# Patient Record
Sex: FEMALE | Race: WHITE | Employment: UNEMPLOYED | URBAN - METROPOLITAN AREA
[De-identification: names, ages, dates, MRNs, and addresses within clinical notes are randomized per-mention and may not be internally consistent; named-entity substitution may affect disease eponyms.]

---

## 2024-02-22 LAB
ANTIBODY SCREEN OB: NEGATIVE
HEPATITIS B SURFACE ANTIGEN OB: NEGATIVE
HIV RESULT OB: NEGATIVE
RAPID PLASMA REAGIN OB: NONREACTIVE
RH FACTOR OB: POSITIVE
STREP GP B CULT OB: NEGATIVE

## 2024-02-28 ENCOUNTER — OFFICE VISIT (OUTPATIENT)
Dept: PERINATAL CARE | Facility: HOSPITAL | Age: 34
End: 2024-02-28
Attending: OBSTETRICS & GYNECOLOGY
Payer: MEDICAID

## 2024-02-28 VITALS
HEART RATE: 90 BPM | HEIGHT: 63 IN | BODY MASS INDEX: 32.78 KG/M2 | DIASTOLIC BLOOD PRESSURE: 61 MMHG | WEIGHT: 185 LBS | SYSTOLIC BLOOD PRESSURE: 98 MMHG

## 2024-02-28 DIAGNOSIS — O09.523 MULTIGRAVIDA OF ADVANCED MATERNAL AGE IN THIRD TRIMESTER (HCC): ICD-10-CM

## 2024-02-28 DIAGNOSIS — O34.32 CERVICAL CERCLAGE SUTURE PRESENT IN SECOND TRIMESTER (HCC): ICD-10-CM

## 2024-02-28 DIAGNOSIS — O34.33 CERVICAL CERCLAGE SUTURE PRESENT IN THIRD TRIMESTER (HCC): ICD-10-CM

## 2024-02-28 DIAGNOSIS — O09.293: ICD-10-CM

## 2024-02-28 DIAGNOSIS — O34.33: ICD-10-CM

## 2024-02-28 DIAGNOSIS — O09.523 MULTIGRAVIDA OF ADVANCED MATERNAL AGE IN THIRD TRIMESTER (HCC): Primary | ICD-10-CM

## 2024-02-28 PROCEDURE — 76811 OB US DETAILED SNGL FETUS: CPT | Performed by: OBSTETRICS & GYNECOLOGY

## 2024-02-28 NOTE — PROGRESS NOTES
Outpatient Maternal-Fetal Medicine Consultation    Dear Dr. Jean-Baptiste    Thank you for requesting ultrasound evaluation and maternal fetal medicine consultation on your patient Bia May.  As you are aware she is a 33 year old female  with a pacheco pregnancy and an Estimated Date of Delivery: 24.  A maternal-fetal medicine consultation was requested secondary to Advanced Maternal Age and Prior  Birth.  Her prenatal records and labs were reviewed.      She had a 23-week loss due to cervical insufficiency and  Alden.  She then had a daughter at term with a cerclage.  She had a  in that pregnancy.  She does not know the reason.  She said that they took out the cerclage and did her  but she does not know the presentation of the baby at that time.  It was in the 37th week.    Cerclage was placed in [facility] this pregnancy at 15 weeks.    Her op report was reviewed.  She has a Riley cerclage placed with Mersilene suture with the knot tied at 12:00.  ROS    HISTORY  OB History    Para Term  AB Living   4 3 2 1 0 2   SAB IAB Ectopic Multiple Live Births   0 0 0 0 2      # Outcome Date GA Lbr Rigo/2nd Weight Sex Delivery Anes PTL Lv   4 Current            3   23w0d    NORMAL SPONT   FD   2 Term  37w0d   F Caesarean   LORRIE   1 Term      NORMAL SPONT   LORRIE       Allergies:  Not on File   Current Meds:  Current Outpatient Medications   Medication Sig Dispense Refill    Prenatal Vit w/Qr-Sarpstilt-UZ (PNV OR) Take by mouth daily.      NADOLOL OR Take by mouth daily.          HISTORY:  No past medical history on file.  Cervical incompetence   No past surgical history on file.  Prior  x 1   No family history on file.   Social History     Socioeconomic History    Marital status: Single          PHYSICAL EXAMINATION:  BP 98/61 (BP Location: Right arm, Patient Position: Sitting, Cuff Size: adult)   Pulse 90   Ht 5' 3\" (1.6 m)    Wt 185 lb (83.9 kg)   BMI 32.77 kg/m²   Physical Exam  Constitutional:       Appearance: Normal appearance.   Abdominal:      Palpations: Abdomen is soft.      Tenderness: There is no abdominal tenderness.   Neurological:      Mental Status: She is alert.   Psychiatric:         Mood and Affect: Mood normal.         Behavior: Behavior normal.           OBSTETRIC ULTRASOUND  The patient had a level II ultrasound today which revealed size consistent with dates with some limited abdominal cord insertion view due to advanced gestational age but an otherwise normal detailed anatomic survey.  Ultrasound Findings:  Single IUP in cephalic presentation.    Placenta is anterior.   A 3 vessel cord is noted.  Cardiac activity is present at 146 bpm  EFW 1289 g ( 2 lb 13 oz);   MVP is 5.6 cm .     The fetal measurements are consistent with the established EDC. No ultrasound evidence of structural abnormalities are seen today. The nasal bone is present. No ultrasound evidence of markers for aneuploidy are seen. She understands that ultrasound exam cannot exclude genetic abnormalities and that genetic testing is recommended. The limitations of ultrasound were discussed.     Uterus and adnexa appeared normal  today on US  See PACS/Imaging Tab For Complete Ultrasound Report  I interpreted the results and reviewed them with the patient.    DISCUSSION  During her visit we discussed and reviewed the following issues:  ADVANCED MATERNAL AGE    Background  I reviewed with the patient that pregnancies in women of advanced maternal age (35 or older at delivery) are associated with elevated risks. Specifically, there is a higher rate of:  Fetal malformations  Preeclampsia  Gestational diabetes  Intrauterine fetal death    As a result, enhanced pregnancy surveillance is advised for these patients including a comprehensive ultrasound to assess for fetal malformations (at 20 weeks) and a third trimester ultrasound assessment for fetal growth  (at 32 weeks). In addition, weekly NST's (initiating at 36 weeks gestation for women 35-39 years and at 32 weeks gestation for women 40 years and older) are also advised. Routine obstetric care is more than adequate to assess for gestational diabetes and preeclampsia; hence, no further significant alterations in obstetric care are advised.    Medical Complications    Women 35 years of age or older can expect to experience two to three fold higher rates of hospitalization,  delivery, and pregnancy-related complications when compared to their younger counterparts.  The two most common medical problems complicating these  pregnanccies are hypertension and diabetes.   The incidence of preeclampsia in the general obstetric population is 3 to 4 percent; this increases to 5 to 10 percent in women over age 40 and is as high as 35 percent in women over age 50.   The incidence of gestational diabetes in the general obstetric population is 3 percent, rising to 7 to 12 percent in women over age 40 and 20 percent in women over age 50.  Women 35 years of age or older are more likely to be delivered by . The  delivery rate in the general obstetric population of the [place] is almost 30 percent, compared to almost 50 percent in women over age 40 to 45 and almost 80 percent in women age 50 to 63.          Fetal Death        A decision analysis tool using data from the [place] [employer]  Database predicted a strategy of weekly antepartum testing and labor induction would lower the risk of unexplained fetal death in women 35 years of age or older. In this model, weekly testing starting at 36 weeks of gestation would drop the risk of fetal death from 5.2 to 1.3 per 1000 pregnancies. While a policy of antepartum testing in older women does increase the chance that a women will be induced (71 inductions per fetal death averted) and thereby increases her risk of having a  delivery, only 14  additional cesareans would need to be performed to avert one unexplained fetal death.  Hence, weekly NST's are advised for women of advanced maternal age; testing should be initiated at 36 weeks for women 35-39 years and at 32 weeks for women 40 years and older.    Fetal Malformations    Cardiac malformations, clubfoot, and diaphragmatic hernia appear to occur with increased frequency in offspring of older women. These abnormalities are structural and unrelated to aneuploidy, thus they would not be detected by karyotype analysis.  For these reasons a complete, detailed ultrasound (level II) is advised even if the fetus has a normal karyotype.      Fetal Aneuploidy            We discussed  the increased risk of chromosomal abnormalities associated with advanced maternal age at age  34 year old. She understands that ultrasound exam cannot exclude potential genetic abnormalities.  Her estimated risk based on maternal age at term with any chromosome abnormality is about 1: 200 and with Down Syndrome is about 1: 350.   We also discussed the risks and benefits of having  genetic testing (CVS and amniocentesis) performed.      Non-invasive Pregnancy Testing (NIPT)  I reviewed current non-invasive screening options. Currently non-invasive pregnancy testing (NIPT) offers the highest detection rate (with the lowest false positive rate) for the detection of fetal aneuploidy amongst high-risk patients. The limitations of detailed mid-trimester sonography was reviewed with the patient. First trimester screening and second trimester multiple-marker serum serum screening as alternative aneuploidy screening options were also reviewed. However, both of these tests are associated with lower detection and higher false positive rates.              IMPRESSION:  IUP at 28w2d   AMA declines aneuploidy screening   History 23-week loss due to cervical incompetence  Prior  x 1  Mersilene Riley cerclage with knot at 12:00 placed at  15 weeks in Health system  ? HTN on coregard    RECOMMENDATIONS:  Continue care with Dr. Jean-Baptiste  Follow-up Growth ultrasound with BPP at 32 weeks.  Weekly NST's at 36 weeks. If hypertension, then weekly NSTs at 32 weeks.  Cerclage removal at 36 to 37 weeks          Thank you for allowing me to participate in the care of your patient.  Please do not hesitate to contact me if additional questions or concerns arise.      Hilda Penaloza M.D.    40 minutes spent in review of records, patient consultation, documentation and coordination of care.  The relevant clinical matter(s) are summarized above.     Note to patient and family  The 21st Century Cures Act makes medical notes available to patients in the interest of transparency.  However, please be advised that this is a medical document.  It is intended as gpqe-ft-ummo communication.  It is written and medical language may contain abbreviations or verbiage that are technical and unfamiliar.  It may appear blunt or direct.  Medical documents are intended to carry relevant information, facts as evident, and the clinical opinion of the practitioner.

## 2024-02-28 NOTE — PROGRESS NOTES
Pt here for Level II Ultrasound  +fm noted per patient  Pt denies complaints.   Pt's brother accompany pt, he speaks English fluently, pt authorizes pt's brother to assist with translation.

## 2024-04-04 ENCOUNTER — HOSPITAL ENCOUNTER (OUTPATIENT)
Dept: PERINATAL CARE | Facility: HOSPITAL | Age: 34
Discharge: HOME | End: 2024-04-04
Attending: OBSTETRICS & GYNECOLOGY
Payer: MEDICAID

## 2024-04-04 VITALS
DIASTOLIC BLOOD PRESSURE: 62 MMHG | WEIGHT: 185 LBS | HEART RATE: 88 BPM | BODY MASS INDEX: 33 KG/M2 | SYSTOLIC BLOOD PRESSURE: 93 MMHG

## 2024-04-04 DIAGNOSIS — O34.219 PREVIOUS CESAREAN DELIVERY AFFECTING PREGNANCY, ANTEPARTUM (HCC): ICD-10-CM

## 2024-04-04 DIAGNOSIS — O34.33 CERVICAL CERCLAGE SUTURE PRESENT IN THIRD TRIMESTER (HCC): ICD-10-CM

## 2024-04-04 DIAGNOSIS — O09.523 AMA (ADVANCED MATERNAL AGE) MULTIGRAVIDA 35+, THIRD TRIMESTER (HCC): Primary | ICD-10-CM

## 2024-04-04 DIAGNOSIS — O09.293: ICD-10-CM

## 2024-04-04 DIAGNOSIS — O09.523 AMA (ADVANCED MATERNAL AGE) MULTIGRAVIDA 35+, THIRD TRIMESTER (HCC): ICD-10-CM

## 2024-04-04 PROCEDURE — 76816 OB US FOLLOW-UP PER FETUS: CPT | Performed by: OBSTETRICS & GYNECOLOGY

## 2024-04-04 PROCEDURE — 76819 FETAL BIOPHYS PROFIL W/O NST: CPT

## 2024-04-04 NOTE — PROGRESS NOTES
Outpatient Maternal-Fetal Medicine Follow-Up     Dear Dr. Jean-Baptiste,     Thank you for requesting ultrasound evaluation and maternal fetal medicine consultation on your patient Bia May.  As you are aware she is a 34 year old female  with a Magdaleno pregnancy and an Estimated Date of Delivery: 24.  She returned to maternal-fetal medicine today for a follow-up visit.  Her history was reviewed from her prior visit and there were no interval changes.    Pertinent obstetrical history:  She had a 23-week loss due to cervical insufficiency and  Alden.  She then had a daughter at term with a cerclage.  She had a  in that pregnancy.  She does not know the reason.  She said that they took out the cerclage and did her  but she does not know the presentation of the baby at that time.  It was in the 37th week.     Cerclage was placed in [facility] this pregnancy at 15 weeks.     Her op report was reviewed.  She has a Riley cerclage placed with Mersilene suture with the knot tied at 12:00.     Antepartum Risk Factors  History of incompetent cervix  Advanced maternal age  Cervical cerclage in place  History of a     S: She reports good fetal movement.  She has no contractions or complications.  We reviewed that she passed her gestational diabetes screen.    At the patient's request, she had her brother serve as her     PHYSICAL EXAMINATION:  BP 93/62   Pulse 88   Wt 185 lb (83.9 kg)   BMI 32.77 kg/m²   General: alert and oriented, no acute distress  Abdomen: gravid, soft, non-tender  Extremities: non-tender, no edema     OBSTETRIC ULTRASOUND  The patient had a fetal growth & BPP ultrasound today which I interpreted the results and reviewed them with the patient.    Ultrasound Findings:  Single IUP in cephalic presentation.    Placenta is anterior.   A 3 vessel cord is noted.  Cardiac activity is present at 150 bpm  EFW 2170 g ( 4 lb 13 oz); 43%.     BILL is  12.1 cm.  MVP is 3.3 cm  BPP is 8/8.     The fetal measurements are consistent with established EDC. No gross ultrasound evidence of structural abnormalities are seen today. The patient understands that ultrasound cannot rule out all structural and chromosomal abnormalities.     See imaging tab for the complete US report.     DISCUSSION  During her visit we discussed and reviewed the following issues:  Advanced maternal age -   We reviewed the risks, both maternal and fetal, of advancing maternal age and pregnancy.  We discussed risks for fetal growth abnormalities, hypertensive complications, maternal hospitalizations,  mortality and  delivery.  We discussed the plan of care and the rationale for the increased surveillance.  See prior [facility] note from 2024 for detailed review.    We discussed her cervical cerclage and history of a  for unknown reason.  She is also discussed with Dr. Marguerite Singh.  Since there is not good information about the type of  that was performed, the patient is going to have a repeat .  Unlikely that the prior  was a classical  therefore I would advise waiting to 39 weeks before having a repeat .      We reviewed the signs and symptoms of preeclampsia,  labor and monitoring fetal movement.  We discussed reasons for her to call her physician.    We discussed the recommended plan of care based on her  risk factors.  Bia and her brother had their questions answered to their satisfaction.         IMPRESSION:  IUP at 33w3d  Normal fetal growth and  testing  Advanced maternal age  History of pregnancy loss at 23 weeks  Cervical cerclage in place  History of      RECOMMENDATIONS:  Continue care with Dr. Jean-Baptiste  Weekly NST at 36 weeks       Total time spent 30 minutes this calendar day which includes preparing to see the patient including chart review, obtaining and/or reviewing  additional medical history, performing a physical exam and evaluation, documenting clinical information in the electronic medical record, independently interpreting results, counseling the patient, communicating results to the patient/family/caregiver and coordinating care.     Case discussed with patient who demonstrated understanding and agreement with plan.     Thank you for allowing me to participate in the care of this patient.  Please feel free to contact me with any questions.    Hilda Rondon MD  Maternal-Fetal Medicine       Note to patient and family:  The 21st Century Cures Act makes medical notes available to patients in the interest of transparency.  However, please be advised that this is a medical document.  It is intended as a peer to peer communication.  It is written in medical language and may contain abbreviations or verbiage that are technical and unfamiliar.  It may appear blunt or direct.  Medical documents are intended to carry relevant information, facts as evident, and the clinical opinion of the practitioner.

## 2024-04-04 NOTE — ADDENDUM NOTE
Encounter addended by: Shellie Jones on: 4/4/2024 12:15 PM   Actions taken: Charge Capture section accepted

## 2024-04-23 ENCOUNTER — HOSPITAL ENCOUNTER (OUTPATIENT)
Facility: HOSPITAL | Age: 34
Discharge: HOME | End: 2024-04-23
Attending: OBSTETRICS & GYNECOLOGY | Admitting: OBSTETRICS & GYNECOLOGY
Payer: MEDICAID

## 2024-04-23 ENCOUNTER — APPOINTMENT (OUTPATIENT)
Dept: OBGYN CLINIC | Facility: HOSPITAL | Age: 34
End: 2024-04-23
Payer: MEDICAID

## 2024-04-23 VITALS
SYSTOLIC BLOOD PRESSURE: 102 MMHG | BODY MASS INDEX: 33 KG/M2 | DIASTOLIC BLOOD PRESSURE: 55 MMHG | WEIGHT: 189 LBS | HEART RATE: 90 BPM

## 2024-04-23 PROCEDURE — 59025 FETAL NON-STRESS TEST: CPT

## 2024-04-24 ENCOUNTER — NST DOCUMENTATION (OUTPATIENT)
Dept: OBGYN UNIT | Facility: HOSPITAL | Age: 34
End: 2024-04-24

## 2024-04-24 NOTE — NST
Nonstress Test   Patient: Bia May    Gestation: 36w2d    Diagnosis from order:      Problem List:   Patient Active Problem List   Diagnosis    History of pregnancy loss in prior pregnancy, currently pregnant in third trimester ([facility])    Multigravida of advanced maternal age in third trimester ([facility])    Cervical cerclage suture present in third trimester ([facility])       NST:        2024   NST DOCUMENTATION   Variability 6-25 BPM   Accelerations Yes   Decelerations None   Baseline 130 BPM   Uterine Irritability No   Contraction Frequency x2   Acoustic Stimulator No   Nonstress Test Interpretation Reactive   Nonstress Test Second Interpretation Reactive   NST Completed by MANSOOR Rocha RN   [facility]    Testing Plan Weekly NST   Provider Notified Dr Marguerite Singh         I agree with the above evaluation. NST completed.  CHANELL CARRILLO MD  2024  10:12 PM

## 2024-04-29 ENCOUNTER — APPOINTMENT (OUTPATIENT)
Dept: OBGYN CLINIC | Facility: HOSPITAL | Age: 34
End: 2024-04-29
Payer: MEDICAID

## 2024-04-29 ENCOUNTER — HOSPITAL ENCOUNTER (OUTPATIENT)
Facility: HOSPITAL | Age: 34
Discharge: HOME | End: 2024-04-29
Attending: OBSTETRICS & GYNECOLOGY | Admitting: OBSTETRICS & GYNECOLOGY
Payer: MEDICAID

## 2024-04-29 VITALS
DIASTOLIC BLOOD PRESSURE: 54 MMHG | RESPIRATION RATE: 20 BRPM | HEART RATE: 92 BPM | SYSTOLIC BLOOD PRESSURE: 96 MMHG | TEMPERATURE: 98 F

## 2024-04-29 DIAGNOSIS — Z34.90 PREGNANCY (HCC): ICD-10-CM

## 2024-04-29 PROCEDURE — 59025 FETAL NON-STRESS TEST: CPT

## 2024-05-01 ENCOUNTER — NST DOCUMENTATION (OUTPATIENT)
Dept: OBGYN UNIT | Facility: HOSPITAL | Age: 34
End: 2024-05-01

## 2024-05-03 ENCOUNTER — NST DOCUMENTATION (OUTPATIENT)
Dept: OBGYN UNIT | Facility: HOSPITAL | Age: 34
End: 2024-05-03

## 2024-05-06 ENCOUNTER — HOSPITAL ENCOUNTER (OUTPATIENT)
Facility: HOSPITAL | Age: 34
Discharge: HOME | End: 2024-05-06
Attending: OBSTETRICS & GYNECOLOGY | Admitting: OBSTETRICS & GYNECOLOGY
Payer: MEDICAID

## 2024-05-06 ENCOUNTER — APPOINTMENT (OUTPATIENT)
Dept: OBGYN CLINIC | Facility: HOSPITAL | Age: 34
End: 2024-05-06
Payer: MEDICAID

## 2024-05-06 VITALS — HEART RATE: 81 BPM | DIASTOLIC BLOOD PRESSURE: 61 MMHG | SYSTOLIC BLOOD PRESSURE: 103 MMHG

## 2024-05-06 DIAGNOSIS — O09.529 AMA (ADVANCED MATERNAL AGE) MULTIGRAVIDA 35+ (HCC): ICD-10-CM

## 2024-05-06 PROCEDURE — 59025 FETAL NON-STRESS TEST: CPT

## 2024-05-11 ENCOUNTER — NST DOCUMENTATION (OUTPATIENT)
Dept: OBGYN UNIT | Facility: HOSPITAL | Age: 34
End: 2024-05-11

## 2024-05-11 NOTE — NST
Nonstress Test   Patient: Bia May    Gestation: 38w5d    Diagnosis from order:      Problem List:   Patient Active Problem List   Diagnosis    History of pregnancy loss in prior pregnancy, currently pregnant in third trimester ([facility])    Multigravida of advanced maternal age in third trimester ([facility])    Cervical cerclage suture present in third trimester ([facility])       NST:        2024   NST DOCUMENTATION   Variability 6-25 BPM   Accelerations Yes   Decelerations None   Baseline 130 BPM   Uterine Irritability No   Contractions Not present   Acoustic Stimulator No   Nonstress Test Interpretation Reactive   Nonstress Test Second Interpretation Reactive   Comments Scheduled nst forAMA and cerclage in place   NST Completed by Jamie RN   Disposition Home    Testing Plan Weekly NST   Provider Notified DR Carrillo            I agree with the above evaluation. NST completed.  CHANELL CARRILLO MD  2024  9:50 PM

## 2024-05-12 ENCOUNTER — NST DOCUMENTATION (OUTPATIENT)
Dept: OBGYN UNIT | Facility: HOSPITAL | Age: 34
End: 2024-05-12

## 2024-05-12 NOTE — NST
Nonstress Test   Patient: Bia May    Gestation: 38w6d    Diagnosis from order:      Problem List:   Patient Active Problem List   Diagnosis    History of pregnancy loss in prior pregnancy, currently pregnant in third trimester ([facility])    Multigravida of advanced maternal age in third trimester ([facility])    Cervical cerclage suture present in third trimester ([facility])       NST:        2024   NST DOCUMENTATION   Variability 6-25 BPM   Accelerations Yes   Decelerations None   Baseline 130 BPM   Uterine Irritability No   Contractions Not present   Acoustic Stimulator No   Nonstress Test Interpretation Reactive   Nonstress Test Second Interpretation Reactive   Comments Scheduled nst forAMA and cerclage in place   NST Completed by Jamie RN   Disposition Home    Testing Plan Weekly NST   Provider Notified DR Carrillo            I agree with the above evaluation. NST completed.  CHANELL CARRILLO MD  2024  9:51 PM

## 2024-05-13 ENCOUNTER — ANESTHESIA EVENT (OUTPATIENT)
Dept: OBGYN UNIT | Facility: HOSPITAL | Age: 34
End: 2024-05-13
Payer: MEDICAID

## 2024-05-13 ENCOUNTER — ANESTHESIA (OUTPATIENT)
Dept: OBGYN UNIT | Facility: HOSPITAL | Age: 34
End: 2024-05-13
Payer: MEDICAID

## 2024-05-13 ENCOUNTER — HOSPITAL ENCOUNTER (INPATIENT)
Facility: HOSPITAL | Age: 34
LOS: 2 days | Discharge: HOME | End: 2024-05-15
Attending: OBSTETRICS & GYNECOLOGY | Admitting: OBSTETRICS & GYNECOLOGY

## 2024-05-13 PROBLEM — Z34.90 PREGNANT (HCC): Status: ACTIVE | Noted: 2024-05-13

## 2024-05-13 LAB
ANTIBODY SCREEN: NEGATIVE
BASOPHILS # BLD AUTO: 0.02 X10(3) UL (ref 0–0.2)
BASOPHILS NFR BLD AUTO: 0.2 %
DEPRECATED RDW RBC AUTO: 45.6 FL (ref 35.1–46.3)
EOSINOPHIL # BLD AUTO: 0.09 X10(3) UL (ref 0–0.7)
EOSINOPHIL NFR BLD AUTO: 1 %
ERYTHROCYTE [DISTWIDTH] IN BLOOD BY AUTOMATED COUNT: 13.7 % (ref 11–15)
HCT VFR BLD AUTO: 32.1 %
HGB BLD-MCNC: 11.4 G/DL
IMM GRANULOCYTES # BLD AUTO: 0.07 X10(3) UL (ref 0–1)
IMM GRANULOCYTES NFR BLD: 0.8 %
LYMPHOCYTES # BLD AUTO: 1.83 X10(3) UL (ref 1–4)
LYMPHOCYTES NFR BLD AUTO: 21 %
MCH RBC QN AUTO: 32.3 PG (ref 26–34)
MCHC RBC AUTO-ENTMCNC: 35.5 G/DL (ref 31–37)
MCV RBC AUTO: 90.9 FL
MONOCYTES # BLD AUTO: 0.56 X10(3) UL (ref 0.1–1)
MONOCYTES NFR BLD AUTO: 6.4 %
NEUTROPHILS # BLD AUTO: 6.14 X10 (3) UL (ref 1.5–7.7)
NEUTROPHILS # BLD AUTO: 6.14 X10(3) UL (ref 1.5–7.7)
NEUTROPHILS NFR BLD AUTO: 70.6 %
PLATELET # BLD AUTO: 205 10(3)UL (ref 150–450)
RBC # BLD AUTO: 3.53 X10(6)UL
RH BLOOD TYPE: POSITIVE
RH BLOOD TYPE: POSITIVE
T PALLIDUM AB SER QL IA: NONREACTIVE
WBC # BLD AUTO: 8.7 X10(3) UL (ref 4–11)

## 2024-05-13 PROCEDURE — 86901 BLOOD TYPING SEROLOGIC RH(D): CPT | Performed by: OBSTETRICS & GYNECOLOGY

## 2024-05-13 PROCEDURE — S0028 INJECTION, FAMOTIDINE, 20 MG: HCPCS | Performed by: OBSTETRICS & GYNECOLOGY

## 2024-05-13 PROCEDURE — 85025 COMPLETE CBC W/AUTO DIFF WBC: CPT | Performed by: OBSTETRICS & GYNECOLOGY

## 2024-05-13 PROCEDURE — 86850 RBC ANTIBODY SCREEN: CPT | Performed by: OBSTETRICS & GYNECOLOGY

## 2024-05-13 PROCEDURE — 86900 BLOOD TYPING SEROLOGIC ABO: CPT | Performed by: OBSTETRICS & GYNECOLOGY

## 2024-05-13 PROCEDURE — 86780 TREPONEMA PALLIDUM: CPT | Performed by: OBSTETRICS & GYNECOLOGY

## 2024-05-13 RX ORDER — ONDANSETRON 2 MG/ML
4 INJECTION INTRAMUSCULAR; INTRAVENOUS EVERY 6 HOURS PRN
Status: DISCONTINUED | OUTPATIENT
Start: 2024-05-13 | End: 2024-05-13 | Stop reason: HOSPADM

## 2024-05-13 RX ORDER — ONDANSETRON 2 MG/ML
4 INJECTION INTRAMUSCULAR; INTRAVENOUS EVERY 6 HOURS PRN
Status: DISCONTINUED | OUTPATIENT
Start: 2024-05-13 | End: 2024-05-15

## 2024-05-13 RX ORDER — IBUPROFEN 600 MG/1
600 TABLET ORAL EVERY 6 HOURS
Status: DISCONTINUED | OUTPATIENT
Start: 2024-05-14 | End: 2024-05-15

## 2024-05-13 RX ORDER — CITRIC ACID/SODIUM CITRATE 334-500MG
30 SOLUTION, ORAL ORAL ONCE
Status: COMPLETED | OUTPATIENT
Start: 2024-05-13 | End: 2024-05-13

## 2024-05-13 RX ORDER — KETOROLAC TROMETHAMINE 30 MG/ML
30 INJECTION, SOLUTION INTRAMUSCULAR; INTRAVENOUS ONCE
Status: COMPLETED | OUTPATIENT
Start: 2024-05-13 | End: 2024-05-13

## 2024-05-13 RX ORDER — BUPIVACAINE HYDROCHLORIDE 7.5 MG/ML
INJECTION, SOLUTION INTRASPINAL
Status: COMPLETED | OUTPATIENT
Start: 2024-05-13 | End: 2024-05-13

## 2024-05-13 RX ORDER — LIDOCAINE HYDROCHLORIDE 10 MG/ML
INJECTION, SOLUTION INFILTRATION; PERINEURAL
Status: COMPLETED | OUTPATIENT
Start: 2024-05-13 | End: 2024-05-13

## 2024-05-13 RX ORDER — HYDROCODONE BITARTRATE AND ACETAMINOPHEN 7.5; 325 MG/1; MG/1
1 TABLET ORAL EVERY 6 HOURS PRN
Status: ACTIVE | OUTPATIENT
Start: 2024-05-13 | End: 2024-05-14

## 2024-05-13 RX ORDER — HYDROCODONE BITARTRATE AND ACETAMINOPHEN 7.5; 325 MG/1; MG/1
2 TABLET ORAL EVERY 6 HOURS PRN
Status: ACTIVE | OUTPATIENT
Start: 2024-05-13 | End: 2024-05-14

## 2024-05-13 RX ORDER — FAMOTIDINE 10 MG/ML
20 INJECTION, SOLUTION INTRAVENOUS ONCE
Status: COMPLETED | OUTPATIENT
Start: 2024-05-13 | End: 2024-05-13

## 2024-05-13 RX ORDER — SODIUM CHLORIDE, SODIUM LACTATE, POTASSIUM CHLORIDE, CALCIUM CHLORIDE 600; 310; 30; 20 MG/100ML; MG/100ML; MG/100ML; MG/100ML
125 INJECTION, SOLUTION INTRAVENOUS CONTINUOUS
Status: DISCONTINUED | OUTPATIENT
Start: 2024-05-13 | End: 2024-05-13 | Stop reason: HOSPADM

## 2024-05-13 RX ORDER — ONDANSETRON 2 MG/ML
4 INJECTION INTRAMUSCULAR; INTRAVENOUS ONCE AS NEEDED
Status: ACTIVE | OUTPATIENT
Start: 2024-05-13 | End: 2024-05-13

## 2024-05-13 RX ORDER — AMMONIA INHALANTS 0.04 G/.3ML
0.3 INHALANT RESPIRATORY (INHALATION) AS NEEDED
Status: DISCONTINUED | OUTPATIENT
Start: 2024-05-13 | End: 2024-05-15

## 2024-05-13 RX ORDER — HYDROMORPHONE HYDROCHLORIDE 1 MG/ML
0.4 INJECTION, SOLUTION INTRAMUSCULAR; INTRAVENOUS; SUBCUTANEOUS
Status: ACTIVE | OUTPATIENT
Start: 2024-05-13 | End: 2024-05-14

## 2024-05-13 RX ORDER — NALOXONE HYDROCHLORIDE 0.4 MG/ML
0.08 INJECTION, SOLUTION INTRAMUSCULAR; INTRAVENOUS; SUBCUTANEOUS
Status: ACTIVE | OUTPATIENT
Start: 2024-05-13 | End: 2024-05-14

## 2024-05-13 RX ORDER — HYDROMORPHONE HYDROCHLORIDE 1 MG/ML
0.6 INJECTION, SOLUTION INTRAMUSCULAR; INTRAVENOUS; SUBCUTANEOUS
Status: DISPENSED | OUTPATIENT
Start: 2024-05-13 | End: 2024-05-14

## 2024-05-13 RX ORDER — SODIUM CHLORIDE, SODIUM LACTATE, POTASSIUM CHLORIDE, CALCIUM CHLORIDE 600; 310; 30; 20 MG/100ML; MG/100ML; MG/100ML; MG/100ML
INJECTION, SOLUTION INTRAVENOUS CONTINUOUS
Status: DISCONTINUED | OUTPATIENT
Start: 2024-05-13 | End: 2024-05-15

## 2024-05-13 RX ORDER — DIPHENHYDRAMINE HYDROCHLORIDE 50 MG/ML
12.5 INJECTION INTRAMUSCULAR; INTRAVENOUS EVERY 4 HOURS PRN
Status: ACTIVE | OUTPATIENT
Start: 2024-05-13 | End: 2024-05-14

## 2024-05-13 RX ORDER — DOCUSATE SODIUM 100 MG/1
100 CAPSULE, LIQUID FILLED ORAL
Status: DISCONTINUED | OUTPATIENT
Start: 2024-05-13 | End: 2024-05-15

## 2024-05-13 RX ORDER — METOCLOPRAMIDE 10 MG/1
10 TABLET ORAL ONCE
Status: COMPLETED | OUTPATIENT
Start: 2024-05-13 | End: 2024-05-13

## 2024-05-13 RX ORDER — EPHEDRINE SULFATE 50 MG/ML
INJECTION INTRAVENOUS AS NEEDED
Status: DISCONTINUED | OUTPATIENT
Start: 2024-05-13 | End: 2024-05-13 | Stop reason: SURG

## 2024-05-13 RX ORDER — MORPHINE SULFATE 1 MG/ML
INJECTION, SOLUTION EPIDURAL; INTRATHECAL; INTRAVENOUS
Status: COMPLETED | OUTPATIENT
Start: 2024-05-13 | End: 2024-05-13

## 2024-05-13 RX ORDER — ACETAMINOPHEN 500 MG
1000 TABLET ORAL EVERY 6 HOURS
Status: DISCONTINUED | OUTPATIENT
Start: 2024-05-13 | End: 2024-05-15

## 2024-05-13 RX ORDER — ACETAMINOPHEN 500 MG
1000 TABLET ORAL ONCE
Status: COMPLETED | OUTPATIENT
Start: 2024-05-13 | End: 2024-05-13

## 2024-05-13 RX ORDER — KETOROLAC TROMETHAMINE 30 MG/ML
30 INJECTION, SOLUTION INTRAMUSCULAR; INTRAVENOUS EVERY 6 HOURS
Status: DISPENSED | OUTPATIENT
Start: 2024-05-13 | End: 2024-05-14

## 2024-05-13 RX ORDER — PROCHLORPERAZINE EDISYLATE 5 MG/ML
5 INJECTION INTRAMUSCULAR; INTRAVENOUS ONCE AS NEEDED
Status: ACTIVE | OUTPATIENT
Start: 2024-05-13 | End: 2024-05-13

## 2024-05-13 RX ORDER — HALOPERIDOL 5 MG/ML
0.5 INJECTION INTRAMUSCULAR ONCE AS NEEDED
Status: ACTIVE | OUTPATIENT
Start: 2024-05-13 | End: 2024-05-13

## 2024-05-13 RX ORDER — METOCLOPRAMIDE HYDROCHLORIDE 5 MG/ML
10 INJECTION INTRAMUSCULAR; INTRAVENOUS ONCE
Status: COMPLETED | OUTPATIENT
Start: 2024-05-13 | End: 2024-05-13

## 2024-05-13 RX ORDER — NALBUPHINE HYDROCHLORIDE 10 MG/ML
2.5 INJECTION, SOLUTION INTRAMUSCULAR; INTRAVENOUS; SUBCUTANEOUS
Status: DISCONTINUED | OUTPATIENT
Start: 2024-05-13 | End: 2024-05-15

## 2024-05-13 RX ORDER — DIPHENHYDRAMINE HCL 25 MG
25 CAPSULE ORAL EVERY 4 HOURS PRN
Status: ACTIVE | OUTPATIENT
Start: 2024-05-13 | End: 2024-05-14

## 2024-05-13 RX ORDER — GABAPENTIN 300 MG/1
300 CAPSULE ORAL EVERY 8 HOURS PRN
Status: DISCONTINUED | OUTPATIENT
Start: 2024-05-13 | End: 2024-05-15

## 2024-05-13 RX ORDER — CEFAZOLIN SODIUM/WATER 2 G/20 ML
2 SYRINGE (ML) INTRAVENOUS ONCE
Status: COMPLETED | OUTPATIENT
Start: 2024-05-13 | End: 2024-05-13

## 2024-05-13 RX ORDER — ACETAMINOPHEN 325 MG/1
650 TABLET ORAL EVERY 6 HOURS PRN
Status: ACTIVE | OUTPATIENT
Start: 2024-05-13 | End: 2024-05-14

## 2024-05-13 RX ORDER — BISACODYL 10 MG
10 SUPPOSITORY, RECTAL RECTAL ONCE AS NEEDED
Status: DISCONTINUED | OUTPATIENT
Start: 2024-05-13 | End: 2024-05-15

## 2024-05-13 RX ORDER — ENOXAPARIN SODIUM 100 MG/ML
40 INJECTION SUBCUTANEOUS EVERY 24 HOURS
Status: DISCONTINUED | OUTPATIENT
Start: 2024-05-13 | End: 2024-05-15

## 2024-05-13 RX ORDER — NALBUPHINE HYDROCHLORIDE 10 MG/ML
2.5 INJECTION, SOLUTION INTRAMUSCULAR; INTRAVENOUS; SUBCUTANEOUS EVERY 4 HOURS PRN
Status: ACTIVE | OUTPATIENT
Start: 2024-05-13 | End: 2024-05-14

## 2024-05-13 RX ORDER — FAMOTIDINE 20 MG/1
20 TABLET, FILM COATED ORAL ONCE
Status: COMPLETED | OUTPATIENT
Start: 2024-05-13 | End: 2024-05-13

## 2024-05-13 RX ORDER — SIMETHICONE 80 MG
80 TABLET,CHEWABLE ORAL 3 TIMES DAILY PRN
Status: DISCONTINUED | OUTPATIENT
Start: 2024-05-13 | End: 2024-05-15

## 2024-05-13 RX ADMIN — CEFAZOLIN SODIUM/WATER 2 G: 2 G/20 ML SYRINGE (ML) INTRAVENOUS at 07:55:00

## 2024-05-13 RX ADMIN — EPHEDRINE SULFATE 10 MG: 50 INJECTION INTRAVENOUS at 07:51:00

## 2024-05-13 RX ADMIN — BUPIVACAINE HYDROCHLORIDE 1.5 ML: 7.5 INJECTION, SOLUTION INTRASPINAL at 07:47:00

## 2024-05-13 RX ADMIN — LIDOCAINE HYDROCHLORIDE 5 ML: 10 INJECTION, SOLUTION INFILTRATION; PERINEURAL at 07:39:00

## 2024-05-13 RX ADMIN — SODIUM CHLORIDE, SODIUM LACTATE, POTASSIUM CHLORIDE, CALCIUM CHLORIDE: 600; 310; 30; 20 INJECTION, SOLUTION INTRAVENOUS at 08:33:00

## 2024-05-13 RX ADMIN — MORPHINE SULFATE 0.3 MG: 1 INJECTION, SOLUTION EPIDURAL; INTRATHECAL; INTRAVENOUS at 07:47:00

## 2024-05-13 RX ADMIN — EPHEDRINE SULFATE 10 MG: 50 INJECTION INTRAVENOUS at 08:00:00

## 2024-05-13 NOTE — ANESTHESIA POSTPROCEDURE EVALUATION
Patient: Bia May    Procedure Summary       Date: 24 Room / Location: [facility] L+D OR  / [facility] L+D OR    Anesthesia Start: 738 Anesthesia Stop: 854    Procedure:  SECTION (Abdomen) Diagnosis: (repeat c/s)    Surgeons: Hollie Jean-Baptiste MD Anesthesiologist: Claudio Ortiz MD    Anesthesia Type: spinal ASA Status: 2            Anesthesia Type: spinal    Vitals Value Taken Time   BP 94/70 24 0854   Temp 97.4 24 0854   Pulse 88 24 0854   Resp 16 24 0854   SpO2 100 % 24 0854   Vitals shown include unfiled device data.    [facility] AN Post Evaluation:   Patient Evaluated in PACU  Patient Participation: complete - patient participated  Level of Consciousness: awake  Pain Management: adequate  Airway Patency:patent  Dental exam unchanged from preop  Yes    Cardiovascular Status: acceptable  Respiratory Status: acceptable  Postoperative Hydration acceptable      CLAUDIO ORTIZ MD  2024 8:54 AM

## 2024-05-13 NOTE — ANESTHESIA PROCEDURE NOTES
Spinal Block    Date/Time: 5/13/2024 7:39 AM    Performed by: Kb Ortiz MD  Authorized by: Kb Ortiz MD      General Information and Staff    Start Time:  5/13/2024 7:39 AM  End Time:  5/13/2024 7:50 AM  Anesthesiologist:  Kb Ortiz MD  Performed by:  Anesthesiologist  Patient Location:  OB  Preanesthetic Checklist: patient identified, IV checked, risks and benefits discussed, monitors and equipment checked, pre-op evaluation, timeout performed, anesthesia consent and sterile technique used      Procedure Details    Patient Position:  Sitting  Prep: ChloraPrep    Monitoring:  Cardiac monitor  Approach:  Midline  Location:  L3-4  Injection Technique:  Single-shot    Needle    Needle Type:  Pencil-tip  Needle Gauge:  24 G  Needle Length:  3.5 in    Assessment    Sensory Level:   Events: clear CSF, CSF aspirated, well tolerated and blood negative      Additional Comments

## 2024-05-13 NOTE — PROGRESS NOTES
Transferred Mother to room    361  via cart, in stable condition. Report given to  Jace WEBB. Bed in locked and low position, side rails up x 2, ID's checked and verified, call light with in reach, reinforced pt to call for assistance when needs  to go to the bathroom.

## 2024-05-13 NOTE — H&P
[facility]  part of [facility]    OB Consult    Bia May Patient Status:  Surgery Admit - Inpt    3/15/1990 MRN [id number]   Location [facility] BIRTH CENTER Attending Hollie Jean-Baptiste MD   Hosp Day # 0 PCP No primary care provider on file.       Date of Admission:  2024    SUBJECTIVE:    Reason for Admission:  Repeat  section    History of Present Illness:  Patient is a 34 year old   IUP 39 weeks admitted for repeat  section. Presently without complaints. Good fetal movement        Medications:  Medications Prior to Admission   Medication Sig Dispense Refill Last Dose    Prenatal Vit w/Cb-Ewhcdevod-XG (PNV OR) Take by mouth daily.   2024    NADOLOL OR Take by mouth daily.          Allergies:  No Known Allergies     Past Medical History:  No past medical history on file.    Past Surgical History:  Past Surgical History:   Procedure Laterality Date          Cholecystectomy         Past OB History:  OB History    Para Term  AB Living   4 3 2 1 0 2   SAB IAB Ectopic Multiple Live Births   0 0 0 0 2      # Outcome Date GA Lbr Rigo/2nd Weight Sex Type Anes PTL Lv   4 Current            3 Term 2018 37w0d   F Caesarean   LORRIE   2  2017 23w0d    NORMAL SPONT   FD   1 Term 2008     NORMAL SPONT   LORRIE       Past GYN History:   Menses Q 30 days x 5-7 days    Social History:  Social History     Tobacco Use    Smoking status: Never     Passive exposure: Never    Smokeless tobacco: Never   Substance Use Topics    Alcohol use: Not Currently        Review of Systems:   As above    OBJECTIVE:  Temp:  [98.1 °F (36.7 °C)] 98.1 °F (36.7 °C)  Pulse:  [96] 96  Resp:  [16] 16  BP: (97)/(59) 97/59  No intake or output data in the 24 hours ending 24 0720    General:   alert, appears stated age, and cooperative   Skin:   normal   Lungs:   clear to auscultation bilaterally   Heart:   S1, S2 normal, regular rate  and rhythm,    Abdomen:  Gravid, NT   Extremities:  NT     FHTs: Reactive   Presentations: Cephalic       ASSESSMENT/PLAN:  34  IUP 39 weeks admission for repeat     All of the findings and plan were discussed with the patient.  She notes understanding and agrees with the plan of care.  All questions were answered to the best of my ability at this time.    CHANELL CARRILLO MD   2024  7:20 AM

## 2024-05-13 NOTE — CM/SW NOTE
[facility] self referral due to finances/WIC and SDOH resources  MDO to [facility] for EDPS    SW spoke to pt via  pt brother)  [facility] confirmed face sheet contact as correct.    Baby boy/girl name:Shannen Rodriguez  Date & time of delivery:24 @ 8:04am  Delivery method: section  Siblings age:16 and 6 yr old    Patient employed: Denied  Length of maternity leave:n/a    Father of baby employed[place] of country  Length of paternity leave:n/a    Breast or formula feed:Formula feed    Pediatrician:  [facility] encouraged pt to schedule infant first appointment (usually within 48 hours of discharge) prior to pt discharge. Pt expressed understanding.     Infant Insurance:Medicaid  [employer] [facility] contacted:Yes    Mental Health History: Denied    Medications:n/a    Therapist:n/a    Psychiatrist:n/a    [facility] discussed signs, symptoms and risks associated with post partum depression & anxiety.  [facility] provided pt with PMAD resources.  Other resources provided:[employer] transportation and mental health resources. [facility] specific resources. SDOH resources  Pt endorses she is current w/WIC services and was encouraged to contact them informing of infants birth.  Pt expressed understanding.     Patient support system:Extended family    Patient denied current questions/needs from [facility].    SW/CM to remain available for support and/or discharge planning.      Carmelina Kc, MSW, LSW  Social Work   Ext:#[phone]

## 2024-05-13 NOTE — ANESTHESIA PREPROCEDURE EVALUATION
Anesthesia PreOp Note    HPI:     Bia May is a 34 year old female who presents for preoperative consultation requested by: Hollie Jean-Baptiste MD    Date of Surgery: 2024    Procedure(s):   SECTION  Indication: repeat c/s    Relevant Problems   No relevant active problems       NPO:  Last Liquid Consumption Date: 24  Last Liquid Consumption Time:   Last Solid Consumption Date: 24  Last Solid Consumption Time:   Last Liquid Consumption Date: 24          History Review:  Patient Active Problem List    Diagnosis Date Noted    Pregnant (Former[facility]) 2024    History of pregnancy loss in prior pregnancy, currently pregnant in third trimester (Formerly [facility]) 2024    Multigravida of advanced maternal age in third trimester (Formerly [facility]) 2024    Cervical cerclage suture present in third trimester (Formerly [facility]) 2024       No past medical history on file.    Past Surgical History:   Procedure Laterality Date          Cholecystectomy         Medications Prior to Admission   Medication Sig Dispense Refill Last Dose    Prenatal Vit w/Lz-Dcxzoxizi-AG (PNV OR) Take by mouth daily.   2024    NADOLOL OR Take by mouth daily.        Current Facility-Administered Medications Ordered in Epic   Medication Dose Route Frequency Provider Last Rate Last Admin    lactated ringers infusion  125 mL/hr Intravenous Continuous Hollie Jean-Baptiste MD        ondansetron (Zofran) 4 MG/2ML injection 4 mg  4 mg Intravenous Q6H PRN Hollie Jean-Baptiste MD        sodium citrate-citric acid (Bicitra) 500-334 MG/5ML oral solution 30 mL  30 mL Oral Once Hollie Jean-Baptiste MD        oxyTOCIN in sodium chloride 0.9% (Pitocin) 30 Units/500mL infusion premix  62.5-900 john-units/min Intravenous Continuous Hollie Jean-Baptiste MD        famotidine (Pepcid) tab 20 mg  20 mg Oral Once Hollie Jean-Baptiste MD        Or    famotidine (Pepcid) 20 mg/2mL injection 20 mg  20 mg Intravenous Once Estiven  MD Hollie        metoclopramide (Reglan) tab 10 mg  10 mg Oral Once Hollie Jean-Baptiste MD        Or    metoclopramide (Reglan) 5 mg/mL injection 10 mg  10 mg Intravenous Once Hollie Jean-Bpatiste MD        ceFAZolin (Ancef) 2 g in 20mL IV syringe premix  2 g Intravenous Once Hollie Jean-Baptiste MD         No current Epic-ordered outpatient medications on file.       No Known Allergies    No family history on file.  Social History     Socioeconomic History    Marital status: Single   Tobacco Use    Smoking status: Never     Passive exposure: Never    Smokeless tobacco: Never   Vaping Use    Vaping status: Never Used   Substance and Sexual Activity    Alcohol use: Not Currently    Drug use: Never       Available pre-op labs reviewed.  Lab Results   Component Value Date    WBC 8.7 05/13/2024    RBC 3.53 (L) 05/13/2024    HGB 11.4 (L) 05/13/2024    HCT 32.1 (L) 05/13/2024    MCV 90.9 05/13/2024    MCH 32.3 05/13/2024    MCHC 35.5 05/13/2024    RDW 13.7 05/13/2024    .0 05/13/2024             Vital Signs:  There is no height or weight on file to calculate BMI.   oral temperature is 98.1 °F (36.7 °C). Her blood pressure is 97/59 and her pulse is 96. Her respiration is 16.   Vitals:    05/13/24 0645   BP: 97/59   Pulse: 96   Resp: 16   Temp: 98.1 °F (36.7 °C)   TempSrc: Oral        Anesthesia Evaluation     Patient summary reviewed and Nursing notes reviewed    No history of anesthetic complications   Airway   Mallampati: II  Neck ROM: full  Dental      Pulmonary - negative ROS and normal exam   Cardiovascular - negative ROS and normal exam    Neuro/Psych - negative ROS     GI/Hepatic/Renal - negative ROS     Endo/Other - negative ROS   Abdominal  - normal exam  (+) obese                 Anesthesia Plan:   ASA:  2  Plan:   Spinal  Informed Consent Plan and Risks Discussed With:  Patient      I have informed Bia May and/or legal guardian or family member of the nature of the anesthetic plan,  benefits, risks including possible dental damage if relevant, major complications, and any alternative forms of anesthetic management.   All of the patient's questions were answered to the best of my ability. The patient desires the anesthetic management as planned.  CLAUDIO LOPEZ MD  5/13/2024 7:18 AM  Present on Admission:  **None**

## 2024-05-13 NOTE — PLAN OF CARE
Mom received into postpartum room in stable condition with all belongings and care partner. Assisted into bed in low locked position with side rails up and call light in reach. Admission fundal assessment completed, vitals stable, bleeding within normal limits. Report received from labor RN Caroline and Mehran. Patient and family oriented to room, plan of care discussed. Charge RN Justine assisted with orienting patient to room and plan of care in [place].

## 2024-05-13 NOTE — BRIEF OP NOTE
[facility]  part of [facility]     Operative Brief Note    Bia May Patient Status:  Inpatient    3/15/1990 MRN [id number]   Location [facility] 3SE Attending Chanell Jean-Baptiste MD   Hosp Day # 0 PCP No primary care provider on file.     Pre Op Diagnosis:  Full term IUP, previous  section  Post Op Diagnosis: same as pre-op  Procedure:   Surgical/Procedural Cases on this Admission       Case IDs Date Procedure Surgeon Location Status    [id number] 24  SECTION Chanell Jean-Baptiste MD [facility] L+D OR [facility]  Surgeon:  CHANELL JEAN-BAPTISTE MD  Assistant Surgeon:  Bj Motley   Anesthesia: spinal  Complications: none  Neonatologist Present: yes  Findings:  see operative report    Delivery     Infant  Date of Delivery: 2024    Time of Delivery: 8:04 AM   Delivery Type: Caesarean Section     Infant Sex  Information for the patient's :  Richy Pompa [[id number]]   child     Infant Birthweight  Information for the patient's :  Richy Pompa [[id number]]   7 lb 8.3 oz (3.41 kg)      Presentation Vertex [1]   Position   Occiput [1]  Anterior [1]     Apgars:  1 minute: 9                5 minutes: 9                         10 minutes:      Placenta  Date/Time of Delivery: 2024  8:06 AM    Delivery: manual extraction  Placenta to Pathology: no  Cord Gases Submitted: yes  Cord Blood/Tissue Collection: yes  Cord Complications: double nuchal  Sponge and Needle Counts:  Verified    QBL:  334ml  Delivery Fluids:  Colloid 2300 ml   Urine Output: 300 ml  Urine Color: clear  Counts: correct x 2   Complications: None   Condition: Stable     CHANELL JEAN-BAPTISTE MD  2024  2:05 PM

## 2024-05-13 NOTE — PROGRESS NOTES
Pt is a 34 year old female admitted to TR5/TR5-A.     Chief Complaint   Patient presents with    Scheduled       Pt is  39w0d intra-uterine pregnancy.  History obtained, consents signed. Oriented to room, staff, and plan of care.

## 2024-05-13 NOTE — PLAN OF CARE
S[place]i language line  used     Kenneth: ID#[id number]    Discussed assessment, plan of care, Hepatitis B vaccine for the baby, pain medication schedule, when we plan to help her ambulate to the bathroom, whether her other kids had jaundice as newborns, feeding frequency and amount of formula for the baby. Patient verbalized understanding and all questions answered.

## 2024-05-13 NOTE — PROGRESS NOTES
Pt is a 38 year old female admitted to Triage 5.     Chief Complaint   Patient presents with    Scheduled Induction     Post dates      Pt is  40w6d intra-uterine pregnancy.  History obtained, consents signed. Oriented to room, staff, and plan of care.

## 2024-05-13 NOTE — PLAN OF CARE
Problem: BIRTH - VAGINAL/ SECTION  Goal: Fetal and maternal status remain reassuring during the birth process  Description: INTERVENTIONS:  - Monitor vital signs  - Monitor fetal heart rate  - Monitor uterine activity  - Monitor labor progression (vaginal delivery)  - DVT prophylaxis (C/S delivery)  - Surgical antibiotic prophylaxis (C/S delivery)  Outcome: Completed     Problem: PAIN - ADULT  Goal: Verbalizes/displays adequate comfort level or patient's stated pain goal  Description: INTERVENTIONS:  - Encourage pt to monitor pain and request assistance  - Assess pain using appropriate pain scale  - Administer analgesics based on type and severity of pain and evaluate response  - Implement non-pharmacological measures as appropriate and evaluate response  - Consider cultural and social influences on pain and pain management  - Manage/alleviate anxiety  - Utilize distraction and/or relaxation techniques  - Monitor for opioid side effects  - Notify MD/LIP if interventions unsuccessful or patient reports new pain  - Anticipate increased pain with activity and pre-medicate as appropriate  Outcome: Progressing     Problem: ANXIETY  Goal: Will report anxiety at manageable levels  Description: INTERVENTIONS:  - Administer medication as ordered  - Teach and rehearse alternative coping skills  - Provide emotional support with 1:1 interaction with staff  Outcome: Progressing     Problem: Patient Centered Care  Goal: Patient preferences are identified and integrated in the patient's plan of care  Description: Interventions:  - What would you like us to know as we care for you? Father of baby is in [facility]  - Provide timely, complete, and accurate information to patient/family  - Incorporate patient and family knowledge, values, beliefs, and cultural backgrounds into the planning and delivery of care  - Encourage patient/family to participate in care and decision-making at the level they choose  - [place] patient and  family perspectives and choices  Outcome: Progressing     Problem: Patient/Family Goals  Goal: Patient/Family Long Term Goal  Description: Patient's Long Term Goal:     Interventions:  -   - See additional Care Plan goals for specific interventions  Outcome: Progressing  Goal: Patient/Family Short Term Goal  Description: Patient's Short Term Goal:     Interventions:   -   - See additional Care Plan goals for specific interventions  Outcome: Progressing     Problem: GASTROINTESTINAL - ADULT  Goal: Minimal or absence of nausea and vomiting  Description: INTERVENTIONS:  - Maintain adequate hydration with IV or PO as ordered and tolerated  - Nasogastric tube to low intermittent suction as ordered  - Evaluate effectiveness of ordered antiemetic medications  - Provide nonpharmacologic comfort measures as appropriate  - Advance diet as tolerated, if ordered  - Obtain nutritional consult as needed  - Evaluate fluid balance  Outcome: Progressing  Goal: Maintains or returns to baseline bowel function  Description: INTERVENTIONS:  - Assess bowel function  - Maintain adequate hydration with IV or PO as ordered and tolerated  - Evaluate effectiveness of GI medications  - Encourage mobilization and activity  - Obtain nutritional consult as needed  - Establish a toileting routine/schedule  - Consider collaborating with pharmacy to review patient's medication profile  Outcome: Progressing  Goal: Maintains adequate nutritional intake (undernourished)  Description: INTERVENTIONS:  - Monitor percentage of each meal consumed  - Identify factors contributing to decreased intake, treat as appropriate  - Assist with meals as needed  - Monitor I&O, WT and lab values  - Obtain nutritional consult as needed  - Optimize oral hygiene and moisture  - Encourage food from home; allow for food preferences  - Enhance eating environment  Outcome: Progressing  Goal: Achieves appropriate nutritional intake (bariatric)  Description: INTERVENTIONS:  -  Monitor for over-consumption  - Identify factors contributing to increased intake, treat as appropriate  - Monitor I&O, WT and lab values  - Obtain nutritional consult as needed  - Evaluate psychosocial factors contributing to over-consumption  Outcome: Completed     Problem: GENITOURINARY - ADULT  Goal: Absence of urinary retention  Description: INTERVENTIONS:  - Assess patient’s ability to void and empty bladder  - Monitor intake/output and perform bladder scan as needed  - Follow urinary retention protocol/standard of care  - Consider collaborating with pharmacy to review patient's medication profile  - Implement strategies to promote bladder emptying  Outcome: Progressing     Problem: POSTPARTUM  Goal: Long Term Goal:Experiences normal postpartum course  Description: INTERVENTIONS:  - Assess and monitor vital signs and lab values.  - Assess fundus and lochia.  - Provide ice/sitz baths for perineum discomfort.  - Monitor healing of incision/episiotomy/laceration, and assess for signs and symptoms of infection and hematoma.  - Assess bladder function and monitor for bladder distention.  - Provide/instruct/assist with pericare as needed.  - Provide VTE prophylaxis as needed.  - Monitor bowel function.  - Encourage ambulation and provide assistance as needed.  - Assess and monitor emotional status and provide social service/psych resources as needed.  - Utilize standard precautions and use personal protective equipment as indicated. Ensure aseptic care of all intravenous lines and invasive tubes/drains.  - Obtain immunization and exposure to communicable diseases history.  Outcome: Progressing  Goal: Experiences normal breast weaning course  Description: INTERVENTIONS:  - Assess for and manage engorgement.  - Instruct on breast care.  - Provide comfort measures.  Outcome: Progressing  Goal: Appropriate maternal -  bonding  Description: INTERVENTIONS:  - Assess caregiver- interactions.  - Assess  caregiver's emotional status and coping mechanisms.  - Encourage caregiver to participate in  daily care.  - Assess support systems available to mother/family.  - Provide /case management support as needed.  Outcome: Progressing

## 2024-05-14 LAB
BASOPHILS # BLD AUTO: 0.02 X10(3) UL (ref 0–0.2)
BASOPHILS NFR BLD AUTO: 0.2 %
DEPRECATED RDW RBC AUTO: 46.1 FL (ref 35.1–46.3)
EOSINOPHIL # BLD AUTO: 0.01 X10(3) UL (ref 0–0.7)
EOSINOPHIL NFR BLD AUTO: 0.1 %
ERYTHROCYTE [DISTWIDTH] IN BLOOD BY AUTOMATED COUNT: 13.7 % (ref 11–15)
HCT VFR BLD AUTO: 31.1 %
HGB BLD-MCNC: 10.6 G/DL
IMM GRANULOCYTES # BLD AUTO: 0.04 X10(3) UL (ref 0–1)
IMM GRANULOCYTES NFR BLD: 0.4 %
LYMPHOCYTES # BLD AUTO: 1.43 X10(3) UL (ref 1–4)
LYMPHOCYTES NFR BLD AUTO: 14.3 %
MCH RBC QN AUTO: 31.5 PG (ref 26–34)
MCHC RBC AUTO-ENTMCNC: 34.1 G/DL (ref 31–37)
MCV RBC AUTO: 92.3 FL
MONOCYTES # BLD AUTO: 0.47 X10(3) UL (ref 0.1–1)
MONOCYTES NFR BLD AUTO: 4.7 %
NEUTROPHILS # BLD AUTO: 8.03 X10 (3) UL (ref 1.5–7.7)
NEUTROPHILS # BLD AUTO: 8.03 X10(3) UL (ref 1.5–7.7)
NEUTROPHILS NFR BLD AUTO: 80.3 %
PLATELET # BLD AUTO: 205 10(3)UL (ref 150–450)
RBC # BLD AUTO: 3.37 X10(6)UL
WBC # BLD AUTO: 10 X10(3) UL (ref 4–11)

## 2024-05-14 PROCEDURE — 85025 COMPLETE CBC W/AUTO DIFF WBC: CPT | Performed by: OBSTETRICS & GYNECOLOGY

## 2024-05-14 RX ORDER — BUTALBITAL, ACETAMINOPHEN AND CAFFEINE 50; 325; 40 MG/1; MG/1; MG/1
1 TABLET ORAL EVERY 4 HOURS PRN
Status: DISCONTINUED | OUTPATIENT
Start: 2024-05-14 | End: 2024-05-15

## 2024-05-14 NOTE — PAYOR COMM NOTE
--------------  ADMISSION REVIEW     Payor: [facility]  Subscriber #:  [id number]  Authorization Number: [id number]      SUBJECTIVE:     Reason for Admission:  Repeat  section     History of Present Illness:  Patient is a 34 year old   IUP 39 weeks admitted for repeat  section. Presently without complaints. Good fetal movement        Operative Brief Note    Pre Op Diagnosis:  Full term IUP, previous  section  Post Op Diagnosis: same as pre-op  Procedure:   Surgical/Procedural Cases on this Admission         Case IDs Date Procedure Surgeon Location Status     [id number] 24  SECTION Hollie Jean-Baptiste MD [facility] L+D OR [facility]             LTCS     Delivery      Infant  Date of Delivery: 2024    Time of Delivery: 8:04 AM   Delivery Type: Caesarean Section      Infant Sex  Information for the patient's :  Richy Pompa [[id number]]   child      Infant Birthweight  Information for the patient's :  Richy Pompa [[id number]]   7 lb 8.3 oz (3.41 kg)       Presentation Vertex [1]   Position   Occiput [1]  Anterior [1]      Apgars:  1 minute: 9                5 minutes: 9                         10 minutes:

## 2024-05-14 NOTE — OPERATIVE REPORT
[facility]    PATIENT'S NAME: CORA REY   ATTENDING PHYSICIAN: Hollie Jean-Baptiste MD   OPERATING PHYSICIAN: Hollie Jean-Baptiste MD   PATIENT ACCOUNT#:   [id number]    LOCATION:  [address] [place] [address] [facility]  MEDICAL RECORD #:   [id number]       YOB: 1990  ADMISSION DATE:       2024      OPERATION DATE:  2024    OPERATIVE REPORT      PREOPERATIVE DIAGNOSIS:  A 39-week intrauterine pregnancy.  Previous  section.  POSTOPERATIVE DIAGNOSIS:  A 39-week intrauterine pregnancy.  Previous  section.  PROCEDURE:  Repeat low transverse  section.    ASSISTANT:  JOCELYN Keyes.    ANESTHESIA:  Spinal.    INDICATIONS:  This is a 34-year-old  4, para 2-1-0-2, with an intrauterine pregnancy at 39 weeks' gestation with history of previous  section, admitted for repeat  section.  The patient understood and accepted the risks of surgery including bleeding, infection, damage to surrounding organs.    OPERATIVE TECHNIQUE:  After appropriate informed surgical consents were obtained, the patient was taken to the operating room.  After receiving general anesthesia, the abdomen was prepped and draped in the usual sterile fashion.  Using a scalpel, a Pfannenstiel skin incision was made, and this was carried down through the subcutaneous tissue to the fascia.  The fascia was then opened in a semilunar fashion.  The rectus abdominis was bluntly and sharply dissected from the midline, and the peritoneum was now entered.  The lower uterine segment was very thin, it was transparent with visible floating vernix, consistent with a uterine dehiscence.  The uterovesical peritoneum was incised, and a bladder flap was created.  A low-transverse uterine incision was made and extended bilaterally in a semilunar fashion. The delivery of a live-born male infant from cephalic presentation.  Two nuchal cords were reduced prior to delivery of the shoulders.  The  infant was bulb suctioned and handed off to the neonatologist in attendance.  Apgars were 9 at one minute, 9 at five minutes.  The baby weighed 7 pounds 8.3 ounces, 3410 g.  A segment of cord was obtained for pH.  Cord blood was obtained.  The placenta was now manually removed.  The uterus was now cleaned with moist laps.  The uterine incision was closed in 2 layers, first layer using 0 Vicryl in a running interlocking fashion and the second using 0 Vicryl in a running imbricating fashion.  Excellent hemostasis of the uterine incision was assured.  The inspection of the right and left fallopian tubes and ovaries was done, and they were noted to be within normal limits.  The layered closure was now performed.  The peritoneum was closed with a suture of 3-0 Vicryl in a running continuous fashion.  The fascia was closed with 2 sutures of 0 looped PDS.  The subcutaneous tissue was reapproximated using 2-0 plain suture, and the skin was closed using 4-0 Monocryl in a running subcuticular stitch.  Quantitative blood loss was 334 mL.  The patient received 2300 mL of lactated Ringer, and the urine output was 300 mL.  The mother and baby tolerated the delivery well.  They were both taken to recovery room in excellent condition.  The sponge, needle, and instrument counts were correct.  There were no complications.    Dictated By Hollie Jean-Baptiste MD  d: 05/13/2024 20:29:54  t: 05/14/2024 03:45:01  Job [id number]  LE/

## 2024-05-15 VITALS
OXYGEN SATURATION: 99 % | WEIGHT: 189 LBS | HEART RATE: 56 BPM | TEMPERATURE: 98 F | SYSTOLIC BLOOD PRESSURE: 104 MMHG | BODY MASS INDEX: 33 KG/M2 | DIASTOLIC BLOOD PRESSURE: 65 MMHG | RESPIRATION RATE: 16 BRPM

## 2024-05-15 NOTE — PLAN OF CARE
Problem: NEUROLOGICAL - ADULT  Goal: Achieves stable or improved neurological status  Description: INTERVENTIONS  - Assess for and report changes in neurological status  - Initiate measures to prevent increased intracranial pressure  - Maintain blood pressure and fluid volume within ordered parameters to optimize cerebral perfusion and minimize risk of hemorrhage  - Monitor temperature, glucose, and sodium. Initiate appropriate interventions as ordered  Outcome: Progressing

## 2024-05-15 NOTE — DISCHARGE SUMMARY
[facility]  part of [facility]    Discharge Summary    Bia May Patient Status:  Inpatient    3/15/1990 MRN [id number]   Location [facility] 3SE Attending Hollie Carrillo MD   Hosp Day # 2 PCP No primary care provider on file.     Date of Admission: 2024   Date of Discharge:  5/15/2024.    Admitting Diagnosis: repeat c/s  Pregnant (HCC)    Disposition: Discharge to home    Discharge Diagnosis: .Active Problems:    Pregnant (HCC)      Hospital Course:   Reason for Admission: Repeat  section    Discharge Physical Exam:   Constitutional: wdwn, nad  Breast: not engorged  Abd: Fundus firm, non tender 2cm below the umbilicus  Ext: non tender  Perineum: moderate lochia    Hospital Course: unremarkable    Complications: none    Discharge Plan:   Discharge Condition: excellent    Current Discharge Medication List        Home Meds - Unchanged    Details   Prenatal Vit w/Sb-Wqzltdebz-PY (PNV OR) Take by mouth daily.      NADOLOL OR Take by mouth daily.                 Discharge Diet: general    Discharge Activity: Pelvic rest, no lifting pushing, carrying greater than 10 lbs, may drive when can brake without pelvic pain.    Follow up:   [address] [place] [address]  [phone]        HOLLIE CARRILLO MD   5/15/2024

## 2024-05-15 NOTE — DISCHARGE INSTRUCTIONS
Follow up in one week for removal of abdominal dressing and 6 pp check.     Motrin 600mg every 6 hours for pain as needed  Tylenol 500mg or 1000mg every 6 hours for Headache as needed.

## 2024-05-15 NOTE — PROGRESS NOTES
[facility]  part of [facility]    OB/GYNE Progress Note      Bia May Patient Status:  Inpatient    3/15/1990 MRN [id number]   Location [facility] 3SE Attending Hollie Carrillo MD   Hosp Day # 2 PCP No primary care provider on file.     Subjective   Desires discharge. States headache continues, declines blood patch    Review of Systems: A comprehensive 10 point review of systems was completed.  Pertinent positives and negatives noted in the the HPI.      Objective   Vital signs in last 24 hours:  Temp:  [98.2 °F (36.8 °C)-98.3 °F (36.8 °C)] 98.2 °F (36.8 °C)  Pulse:  [56-73] 56  Resp:  [16] 16  BP: ()/(60-65) 104/65    Input/Output:  No intake or output data in the 24 hours ending 05/15/24 1317    Weight (Last 6):  Wt Readings from Last 6 Encounters:   24 189 lb (85.7 kg)   24 189 lb (85.7 kg)   24 185 lb (83.9 kg)   24 185 lb (83.9 kg)     Body mass index is 33.48 kg/m².    PE:  Constitutional: wdwn, nad  Breast: not engorged  Abd: Fundus firm, non tender 2cm below the umbilicus  Ext: non tender  Perineum: moderate lochia    Results:     Recent Labs   Lab 24  0650 24  0557   RBC 3.53* 3.37*   HGB 11.4* 10.6*   HCT 32.1* 31.1*   MCV 90.9 92.3   MCH 32.3 31.5   MCHC 35.5 34.1   RDW 13.7 13.7   NEPRELIM 6.14 8.03*   WBC 8.7 10.0   .0 205.0         Assessment/Plan   Discussed with: Nursing staff        Assessment/Plan: Patient is Post-partum day 2 doing well, home today. Discharge instructions given. Follow up in office in 1 week    HOLLIE CARRILLO MD   5/15/2024  1:17 PM

## 2024-06-18 ENCOUNTER — TELEPHONE (OUTPATIENT)
Dept: OBGYN UNIT | Facility: HOSPITAL | Age: 34
End: 2024-06-18

## (undated) DEVICE — 3M™ MEDIPORE™ H SOFT CLOTH SURGICAL TAPE 2864, 4 INCH X 10 YARD (10CM X 9,14M), 12 ROLLS/CASE: Brand: 3M™ MEDIPORE™

## (undated) DEVICE — POWDER HEMSTAT 3GM PURIFIED PLNT STARCH ABSRB

## (undated) NOTE — LETTER
2024      Hollie Jean-Baptiste MD  [address]  [address]  [facility] [facility] [address]  Via Fax: [phone]  Patient: Bia May  : 3/15/1990    Dear Colleague:  Thank you for referring your patient to me for a Maternal Fetal Medicine evaluation. Please see my attached note for my findings and recommendations.      Should you have any questions or concerns, please do not hesitate to contact me at the number listed below.    Best Regards,      Hilda Rondon MD  [facility] MATERNAL FETAL MEDICINE  [address] [facility] [address]  [phone]    cc: No Recipients      [facility] Department of Maternal Fetal Medicine  Patient Name: Bia May  Patient : 3/15/1990  Physician: Hilda Rondon MD    Pt for Growth US/BPP  Denies pregnancy complaints  States active fetus     Outpatient Maternal-Fetal Medicine Follow-Up     Dear Dr. Jean-Baptiste,     Thank you for requesting ultrasound evaluation and maternal fetal medicine consultation on your patient Bia May.  As you are aware she is a 34 year old female  with a Magdaleno pregnancy and an Estimated Date of Delivery: 24.  She returned to maternal-fetal medicine today for a follow-up visit.  Her history was reviewed from her prior visit and there were no interval changes.    Pertinent obstetrical history:  She had a 23-week loss due to cervical insufficiency and  Alden.  She then had a daughter at term with a cerclage.  She had a  in that pregnancy.  She does not know the reason.  She said that they took out the cerclage and did her  but she does not know the presentation of the baby at that time.  It was in the 37th week.     Cerclage was placed in [facility] this pregnancy at 15 weeks.     Her op report was reviewed.  She has a Riley cerclage placed with [place] suture with the knot tied at 12:00.     Antepartum Risk Factors  History of  incompetent cervix  Advanced maternal age  Cervical cerclage in place  History of a     S: She reports good fetal movement.  She has no contractions or complications.  We reviewed that she passed her gestational diabetes screen.    At the patient's request, she had her brother serve as her     PHYSICAL EXAMINATION:  BP 93/62   Pulse 88   Wt 185 lb (83.9 kg)   BMI 32.77 kg/m²   General: alert and oriented, no acute distress  Abdomen: gravid, soft, non-tender  Extremities: non-tender, no edema     OBSTETRIC ULTRASOUND  The patient had a fetal growth & BPP ultrasound today which I interpreted the results and reviewed them with the patient.    Ultrasound Findings:  Single IUP in cephalic presentation.    Placenta is anterior.   A 3 vessel cord is noted.  Cardiac activity is present at 150 bpm  EFW 2170 g ( 4 lb 13 oz); 43%.    BILL is  12.1 cm.  MVP is 3.3 cm  BPP is 8/8.     The fetal measurements are consistent with established EDC. No gross ultrasound evidence of structural abnormalities are seen today. The patient understands that ultrasound cannot rule out all structural and chromosomal abnormalities.     See imaging tab for the complete US report.     DISCUSSION  During her visit we discussed and reviewed the following issues:  Advanced maternal age -   We reviewed the risks, both maternal and fetal, of advancing maternal age and pregnancy.  We discussed risks for fetal growth abnormalities, hypertensive complications, maternal hospitalizations,  mortality and  delivery.  We discussed the plan of care and the rationale for the increased surveillance.  See prior [facility] note from 2024 for detailed review.    We discussed her cervical cerclage and history of a  for unknown reason.  She is also discussed with Dr. Marguerite Singh.  Since there is not good information about the type of  that was performed, the patient is going to have a repeat .  Unlikely  that the prior  was a classical  therefore I would advise waiting to 39 weeks before having a repeat .      We reviewed the signs and symptoms of preeclampsia,  labor and monitoring fetal movement.  We discussed reasons for her to call her physician.    We discussed the recommended plan of care based on her  risk factors.  Bia and her brother had their questions answered to their satisfaction.         IMPRESSION:  IUP at 33w3d  Normal fetal growth and  testing  Advanced maternal age  History of pregnancy loss at 23 weeks  Cervical cerclage in place  History of      RECOMMENDATIONS:  Continue care with Dr. Jean-Baptiste  Weekly NST at 36 weeks       Total time spent 30 minutes this calendar day which includes preparing to see the patient including chart review, obtaining and/or reviewing additional medical history, performing a physical exam and evaluation, documenting clinical information in the electronic medical record, independently interpreting results, counseling the patient, communicating results to the patient/family/caregiver and coordinating care.     Case discussed with patient who demonstrated understanding and agreement with plan.     Thank you for allowing me to participate in the care of this patient.  Please feel free to contact me with any questions.    Hilda Rondon MD  Maternal-Fetal Medicine       Note to patient and family:  The 21st Century Cures Act makes medical notes available to patients in the interest of transparency.  However, please be advised that this is a medical document.  It is intended as a peer to peer communication.  It is written in medical language and may contain abbreviations or verbiage that are technical and unfamiliar.  It may appear blunt or direct.  Medical documents are intended to carry relevant information, facts as evident, and the clinical opinion of the practitioner.